# Patient Record
Sex: FEMALE | Race: WHITE | NOT HISPANIC OR LATINO | Employment: OTHER | URBAN - METROPOLITAN AREA
[De-identification: names, ages, dates, MRNs, and addresses within clinical notes are randomized per-mention and may not be internally consistent; named-entity substitution may affect disease eponyms.]

---

## 2017-02-27 ENCOUNTER — OFFICE VISIT - RIVER FALLS (OUTPATIENT)
Dept: FAMILY MEDICINE | Facility: CLINIC | Age: 52
End: 2017-02-27

## 2017-02-27 ASSESSMENT — MIFFLIN-ST. JEOR: SCORE: 1492.64

## 2018-09-04 ENCOUNTER — OFFICE VISIT - RIVER FALLS (OUTPATIENT)
Dept: FAMILY MEDICINE | Facility: CLINIC | Age: 53
End: 2018-09-04

## 2018-09-04 ASSESSMENT — MIFFLIN-ST. JEOR: SCORE: 1455.67

## 2018-09-05 LAB
CHOLEST SERPL-MCNC: 221 MG/DL
CHOLEST/HDLC SERPL: 4.9 {RATIO}
GLUCOSE BLD-MCNC: 84 MG/DL (ref 65–99)
HDLC SERPL-MCNC: 45 MG/DL
LDLC SERPL CALC-MCNC: 143 MG/DL
NONHDLC SERPL-MCNC: 176 MG/DL
TRIGL SERPL-MCNC: 193 MG/DL

## 2018-09-06 ENCOUNTER — OFFICE VISIT - RIVER FALLS (OUTPATIENT)
Dept: FAMILY MEDICINE | Facility: CLINIC | Age: 53
End: 2018-09-06

## 2019-09-21 ENCOUNTER — AMBULATORY - RIVER FALLS (OUTPATIENT)
Dept: FAMILY MEDICINE | Facility: CLINIC | Age: 54
End: 2019-09-21

## 2019-09-22 LAB
CHOLEST SERPL-MCNC: 196 MG/DL
CHOLEST/HDLC SERPL: 3.3 {RATIO}
GLUCOSE BLD-MCNC: 91 MG/DL (ref 65–99)
HDLC SERPL-MCNC: 60 MG/DL
LDLC SERPL CALC-MCNC: 120 MG/DL
NONHDLC SERPL-MCNC: 136 MG/DL
TRIGL SERPL-MCNC: 66 MG/DL

## 2019-09-24 ENCOUNTER — COMMUNICATION - RIVER FALLS (OUTPATIENT)
Dept: FAMILY MEDICINE | Facility: CLINIC | Age: 54
End: 2019-09-24

## 2019-09-27 ENCOUNTER — OFFICE VISIT - RIVER FALLS (OUTPATIENT)
Dept: FAMILY MEDICINE | Facility: CLINIC | Age: 54
End: 2019-09-27

## 2019-09-27 ASSESSMENT — MIFFLIN-ST. JEOR: SCORE: 1378.56

## 2019-11-05 ENCOUNTER — OFFICE VISIT - RIVER FALLS (OUTPATIENT)
Dept: FAMILY MEDICINE | Facility: CLINIC | Age: 54
End: 2019-11-05

## 2019-11-05 ASSESSMENT — MIFFLIN-ST. JEOR: SCORE: 1397.61

## 2020-02-04 ENCOUNTER — OFFICE VISIT - RIVER FALLS (OUTPATIENT)
Dept: FAMILY MEDICINE | Facility: CLINIC | Age: 55
End: 2020-02-04

## 2020-02-04 ASSESSMENT — MIFFLIN-ST. JEOR: SCORE: 1393.07

## 2020-02-06 LAB
BASOPHILS # BLD MANUAL: 39 10*3/UL (ref 0–200)
BASOPHILS NFR BLD MANUAL: 0.5 %
BUN SERPL-MCNC: 26 MG/DL (ref 7–25)
BUN/CREAT RATIO - HISTORICAL: 34 (ref 6–22)
CALCIUM SERPL-MCNC: 9.7 MG/DL (ref 8.6–10.4)
CHLORIDE BLD-SCNC: 105 MMOL/L (ref 98–110)
CO2 SERPL-SCNC: 26 MMOL/L (ref 20–32)
CREAT SERPL-MCNC: 0.77 MG/DL (ref 0.5–1.05)
EGFRCR SERPLBLD CKD-EPI 2021: 88 ML/MIN/1.73M2
EOSINOPHIL # BLD MANUAL: 77 10*3/UL (ref 15–500)
EOSINOPHIL NFR BLD MANUAL: 1 %
ERYTHROCYTE [DISTWIDTH] IN BLOOD BY AUTOMATED COUNT: 12.2 % (ref 11–15)
GLUCOSE BLD-MCNC: 94 MG/DL (ref 65–99)
HCT VFR BLD AUTO: 39.6 % (ref 35–45)
HGB BLD-MCNC: 13.8 GM/DL (ref 11.7–15.5)
LYMPHOCYTES # BLD MANUAL: 2710 10*3/UL (ref 850–3900)
LYMPHOCYTES NFR BLD MANUAL: 35.2 %
MCH RBC QN AUTO: 30.5 PG (ref 27–33)
MCHC RBC AUTO-ENTMCNC: 34.8 GM/DL (ref 32–36)
MCV RBC AUTO: 87.4 FL (ref 80–100)
MONOCYTES # BLD MANUAL: 647 10*3/UL (ref 200–950)
MONOCYTES NFR BLD MANUAL: 8.4 %
NEUTROPHILS # BLD MANUAL: 4227 10*3/UL (ref 1500–7800)
NEUTROPHILS NFR BLD MANUAL: 54.9 %
PLATELET # BLD AUTO: 297 10*3/UL (ref 140–400)
PMV BLD: 9.9 FL (ref 7.5–12.5)
POTASSIUM BLD-SCNC: 4.1 MMOL/L (ref 3.5–5.3)
RBC # BLD AUTO: 4.53 10*6/UL (ref 3.8–5.1)
SODIUM SERPL-SCNC: 138 MMOL/L (ref 135–146)
WBC # BLD AUTO: 7.7 10*3/UL (ref 3.8–10.8)

## 2020-12-07 ENCOUNTER — COMMUNICATION - RIVER FALLS (OUTPATIENT)
Dept: FAMILY MEDICINE | Facility: CLINIC | Age: 55
End: 2020-12-07

## 2020-12-09 ENCOUNTER — OFFICE VISIT - RIVER FALLS (OUTPATIENT)
Dept: FAMILY MEDICINE | Facility: CLINIC | Age: 55
End: 2020-12-09

## 2021-12-07 ENCOUNTER — OFFICE VISIT - RIVER FALLS (OUTPATIENT)
Dept: FAMILY MEDICINE | Facility: CLINIC | Age: 56
End: 2021-12-07

## 2021-12-07 ASSESSMENT — MIFFLIN-ST. JEOR: SCORE: 1423.69

## 2021-12-08 ENCOUNTER — COMMUNICATION - RIVER FALLS (OUTPATIENT)
Dept: FAMILY MEDICINE | Facility: CLINIC | Age: 56
End: 2021-12-08

## 2021-12-08 LAB
CHOLEST SERPL-MCNC: 246 MG/DL
CHOLEST/HDLC SERPL: 4.2 {RATIO}
GLUCOSE BLD-MCNC: 82 MG/DL (ref 65–99)
HDLC SERPL-MCNC: 59 MG/DL
LDLC SERPL CALC-MCNC: 164 MG/DL
NONHDLC SERPL-MCNC: 187 MG/DL
TRIGL SERPL-MCNC: 110 MG/DL

## 2022-01-03 ENCOUNTER — COMMUNICATION - RIVER FALLS (OUTPATIENT)
Dept: FAMILY MEDICINE | Facility: CLINIC | Age: 57
End: 2022-01-03

## 2022-02-12 VITALS
TEMPERATURE: 98 F | HEART RATE: 80 BPM | HEIGHT: 64 IN | WEIGHT: 186 LBS | SYSTOLIC BLOOD PRESSURE: 112 MMHG | BODY MASS INDEX: 31.76 KG/M2 | DIASTOLIC BLOOD PRESSURE: 79 MMHG

## 2022-02-12 VITALS
WEIGHT: 177.8 LBS | HEART RATE: 76 BPM | WEIGHT: 182 LBS | DIASTOLIC BLOOD PRESSURE: 74 MMHG | TEMPERATURE: 98.6 F | TEMPERATURE: 98.1 F | DIASTOLIC BLOOD PRESSURE: 80 MMHG | SYSTOLIC BLOOD PRESSURE: 117 MMHG | BODY MASS INDEX: 30.35 KG/M2 | HEIGHT: 64 IN | HEIGHT: 64 IN | SYSTOLIC BLOOD PRESSURE: 118 MMHG | BODY MASS INDEX: 31.07 KG/M2 | HEART RATE: 68 BPM

## 2022-02-12 VITALS
BODY MASS INDEX: 30.9 KG/M2 | HEART RATE: 69 BPM | TEMPERATURE: 98.2 F | WEIGHT: 181 LBS | HEIGHT: 64 IN | SYSTOLIC BLOOD PRESSURE: 109 MMHG | DIASTOLIC BLOOD PRESSURE: 76 MMHG

## 2022-02-12 VITALS
BODY MASS INDEX: 34.35 KG/M2 | TEMPERATURE: 99 F | SYSTOLIC BLOOD PRESSURE: 124 MMHG | OXYGEN SATURATION: 98 % | HEART RATE: 90 BPM | HEIGHT: 64 IN | DIASTOLIC BLOOD PRESSURE: 86 MMHG | WEIGHT: 201.2 LBS

## 2022-02-12 VITALS
HEIGHT: 64 IN | DIASTOLIC BLOOD PRESSURE: 77 MMHG | WEIGHT: 194.8 LBS | HEART RATE: 67 BPM | SYSTOLIC BLOOD PRESSURE: 108 MMHG | BODY MASS INDEX: 33.26 KG/M2 | TEMPERATURE: 98.3 F

## 2022-02-15 NOTE — PROGRESS NOTES
Patient:   QUINTIN VELAZQUEZ            MRN: [id number]            FIN: [id number]               Age:   56 years     Sex:  Female     :  1965   Associated Diagnoses:   Well adult exam; High cholesterol; CRUZITO on CPAP   Author:   Jacob Gonzalez MD      Visit Information      Date of Service: 2021 08:31 am  Performing Location: [facility]  Encounter#: [id number]      Primary Care Provider (PCP):  Jacob Gonzalez MD    NPI# [id number]   Visit type:  Annual exam.    Accompanied by:  No one.    Source of history:  Self.       Chief Complaint   2021 8:37 AM CST    Annual exam. COVID initial, flu, Tdap due. Cologuard due. Mammo due. DEXA due. Discuss if still needs amoxicillin.        Well Adult History   Patient is in today for annual exam.  Overall she is doing well.  She is now retired from [employer].  Enjoying FDC with her .  Enjoy the grandkids the home they are buildingJoin [employer] and riding motorcycle.          Review of Systems   Constitutional:  No weakness, No fatigue, No decreased activity, No weight gain.    Eye:  No recent visual problem, No blurring, No double vision.    Ear/Nose/Mouth/Throat:  Negative.    Respiratory:  No shortness of breath, No cough.    Cardiovascular:  No chest pain, No peripheral edema.    Gastrointestinal:  No nausea, No vomiting, No diarrhea, No constipation, No abdominal pain.    Genitourinary:  No dysuria, No hematuria, No change in urine stream.    Gynecologic:  Negative.    Hematology/Lymphatics:  Negative.    Endocrine:  Negative.    Immunologic:  Negative.    Musculoskeletal:  Negative.    Integumentary:  No rash.    Neurologic:  Alert and oriented X4.    Psychiatric:  Negative.       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  Documented Medications  Documented  Daily Multiple Vitamins: PO, Daily, 0      Histories   Past Medical History:    Active  Scalp psoriasis (928190104)  Obesity (9467250584)  Resolved  Skin cancer (7229548895): Onset on 10/1/2007 at 42 years.  Resolved.   Family History:    MI  Father ()  Diabetes mellitus type 2  Father ()     Procedure history:    Screening for colon cancer (SNOMED CT 8965950066) performed by Jacob Gonzalez MD on 2018 at 53 Years.  Comments:  10/1/2018 4:28 PM CDT - Olivia Rosales MA result:  negative  Recommendation:  f/u 3yrs  Cardiac computed tomography for calcium scoring (SNOMED CT 7258167747) on 2018 at 53 Years.  Comments:  2018 2:13 PM CDT - Alanis Klein  Total Agatston calcium score is 0  DEXA - Dual energy X-ray photon absorptiometry (SNOMED CT 271430885) on 2011 at 46 Years.  Comments:  2011 4:18 PM CST - Cait Arrington  Stable osteopenia. Repeat 5 yrs. (Due )  Laparoscopic vaginal hysterectomy (SNOMED CT 2527884596) performed by Emile Rivera MD on 2003 at 38 Years.  LAVH - Laparoscopy assisted vaginal hysterectomy (SNOMED CT 0890994382) on 2003 at 38 Years.  Salpingo-oophorectomy - Left on 2001 at 36 Years.  Laparoscopy with lysis of adhesions (SNOMED CT 94247209) on 2001 at 36 Years.  Fracture of Wrist (ICD-9-.00) in the month of 2000 at 35 Years.  Breast reduction, bilateral (SNOMED CT 212700725) in  at 23 Years.  Partial oophorectomy - Left in  at 22 Years.  NVD (Normal Vaginal  Delivery) (ICD-9-) in 1985 at 20 Years.   Social History:        Electronic Cigarette/Vaping Assessment            Electronic Cigarette Use: Never.      Alcohol Assessment            Current, Wine (5 oz), 0-1 time per week, 1 drinks/episode average.      Tobacco Assessment            Former smoker, quit more than 30 days ago            Former smoker, quit more than 30 days ago      Substance Abuse Assessment            Never      Employment and Education Assessment            Employed, Work/School description: .      Home and Environment Assessment            Marital status: .      Nutrition and Health Assessment            Type of diet: Regular.      Sexual Assessment            Sexually active: Yes.  Identifies as female, Sexual orientation: Straight or heterosexual.  ,        Electronic Cigarette/Vaping Assessment            Electronic Cigarette Use: Never.      Alcohol Assessment            Current, Wine (5 oz), 0-1 time per week, 1 drinks/episode average.      Tobacco Assessment            Former smoker, quit more than 30 days ago            Former smoker, quit more than 30 days ago      Substance Abuse Assessment            Never      Employment and Education Assessment            Employed, Work/School description: .      Home and Environment Assessment            Marital status: .      Nutrition and Health Assessment            Type of diet: Regular.      Sexual Assessment            Sexually active: Yes.  Identifies as female, Sexual orientation: Straight or heterosexual.        Physical Examination   Vital Signs   12/7/2021 8:37 AM CST Temperature Tympanic 98.0 DegF    Peripheral Pulse Rate 80 bpm    Pulse Site Radial artery    HR Method Electronic    Systolic Blood Pressure 112 mmHg    Diastolic Blood Pressure 79 mmHg    Mean Arterial Pressure 90 mmHg    BP Site Right arm    BP Method Electronic      Measurements from flowsheet : Measurements   12/7/2021 8:37  AM CST Height Measured - Standard 64 in    Weight Measured - Standard 186 lb    BSA 1.95 m2    Body Mass Index 31.92 kg/m2  HI      General:  Alert and oriented, No acute distress.    Eye:  Pupils are equal, round and reactive to light, Extraocular movements are intact, Normal conjunctiva, Vision unchanged.    HENT:  Normocephalic, Tympanic membranes are clear, Oral mucosa is moist, No pharyngeal erythema.    Neck:  Supple, Non-tender, No carotid bruit, No jugular venous distention, No lymphadenopathy, No thyromegaly.    Respiratory:  Lungs are clear to auscultation, Respirations are non-labored, Breath sounds are equal, Symmetrical chest wall expansion.    Cardiovascular:  Normal rate, Regular rhythm, No murmur, Good pulses equal in all extremities, Normal peripheral perfusion, No edema.    Gastrointestinal:  Soft, Non-tender, Non-distended, Normal bowel sounds, No organomegaly.    Musculoskeletal:  Normal range of motion, Normal strength, No tenderness, No deformity, Normal gait.    Integumentary:  Warm, Dry, No rash.    Neurologic:  Alert, Oriented, No focal deficits, Cranial Nerves II-XII are grossly intact.    Psychiatric:  Cooperative, Appropriate mood & affect.       Impression and Plan   Diagnosis     Well adult exam (MAU28-OQ Z00.00).     High cholesterol (WLI20-LI E78.00).     CRUZITO on CPAP (ZUI96-UJ G47.33).     Course:  Progressing as expected.    Plan:  fu 1 yr, 1.  Sleep apnea waiting for her new machine  2.  Hyperlipidemia with negative cardiac calcium score  3 history of hip replacement #  ???????4 history of osteopenia will get bone density  5.  Immunization update she will get her first Covid shot today she is due for flu shot and tetanus yet  .    Patient Instructions:       Counseled: Patient, Regarding diagnosis.

## 2022-02-15 NOTE — NURSING NOTE
Comprehensive Intake Entered On:  11/5/2019 6:43 PM CST    Performed On:  11/5/2019 6:39 PM CST by Maria Esther Goodson CMA               Summary   Chief Complaint :   R hip pain on/off x years but worsening since this weekend. Using Aleve, Ibuprofen and icing.    Weight Measured :   182 lb(Converted to: 182 lb 0 oz, 82.55 kg)    Height Measured :   63.5 in(Converted to: 5 ft 3 in, 161.29 cm)    Body Mass Index :   31.73 kg/m2 (HI)    Body Surface Area :   1.92 m2   Systolic Blood Pressure :   118 mmHg   Diastolic Blood Pressure :   80 mmHg   Mean Arterial Pressure :   93 mmHg   Peripheral Pulse Rate :   68 bpm   BP Site :   Right arm   Pulse Site :   Radial artery   BP Method :   Manual   HR Method :   Manual   Temperature Tympanic :   98.1 DegF(Converted to: 36.7 DegC)    Maria Esther Goodson CMA - 11/5/2019 6:39 PM CST   Health Status   Allergies Verified? :   Yes   Medication History Verified? :   Yes   Pre-Visit Planning Status :   Completed   Maria Esther Goodson CMA - 11/5/2019 6:39 PM CST   Meds / Allergies   (As Of: 11/5/2019 6:43:59 PM CST)   Allergies (Active)   No known allergies  Estimated Onset Date:   Unspecified ; Created By:   Karen Pettit CMA; Reaction Status:   Active ; Category:   Drug ; Substance:   No known allergies ; Type:   Allergy ; Updated By:   Karen Pettit CMA; Source:   Paper Chart ; Reviewed Date:   9/4/2018 2:11 PM CDT        Medication List   (As Of: 11/5/2019 6:43:59 PM CST)   Home Meds    multivitamin  :   multivitamin ; Status:   Documented ; Ordered As Mnemonic:   Daily Multiple Vitamins ; Simple Display Line:   PO, Daily ; Catalog Code:   multivitamin ; Order Dt/Tm:   2/16/2010 11:17:44 AM CST

## 2022-02-15 NOTE — NURSING NOTE
CAGE Assessment Entered On:  10/2/2019 8:27 AM CDT    Performed On:  9/27/2019 8:27 AM CDT by Jeannine Bonilla               Assessment   Have you ever felt you should cut down on your drinking :   No   Have people annoyed you by criticizing your drinking :   No   Have you ever felt bad or guilty about your drinking :   No   Have you ever taken a drink first thing in the morning to steady your nerves or get rid of a hangover (Eye-opener) :   No   CAGE Score :   0    Jeannine Bonilla - 10/2/2019 8:27 AM CDT

## 2022-02-15 NOTE — TELEPHONE ENCOUNTER
---------------------  From: Marina Guerrero (TFS Message Pool ([id number]))   To: Recall  Pool ([address] [place] [address]);     Sent: 10/2/2020 4:09:08 PM CDT  Subject: General Message     send letter per TFS      Addendum by Karen Goddard on October 02, 2020 9:34:02 AM CDT  ---------------------  From: Karen Goddard (Appointment Pool ([id number]))   To: TFS Message Pool ([address] [place] [address]);     Sent: 10/2/2020 9:34:02 AM CDT Show up: 10/2/2020 9:33:00 AM CDT  Subject: RE: WELL ADULT 2020     LVM x2- sent to pool/provider        Addendum by Timb , April on September 30, 2020 12:59:35 PM CDT  left voicemail         ---------------------  From: Malia Mckenzie (Recall  Pool ([id number]))   To: Appointment Pool ([address] [place] [address]);     Sent: 9/30/2020 11:22:37 AM CDT ! Show up: 9/30/2020 11:22:00 AM CDT        Addendum by Malia Mckenzie on September 30, 2020 11:22:33 AM CDT  Provider requesting patient complete services:  Due for:                 (_) fasting labs (nothing to eat or drink for 12 hrs prior to labs, may sip clear water and take medications morning of labs with water only)   (_) non-fasting labs  (_) urine labs (be prepared to leave a urine specimen)  (_) BP check   (_)  Follow-up appointment with Maye Kenney RD, CDE, CD  (X) follow-up appointment with your health care provider  Condition following up on: Dx:  Well Adult       Provider:  TFS         ---------------------  From: Malia Mckenzie   To: Recall  Pool ([id number]);     Sent: 9/30/2019 2:14:06 PM CDT Show up: 9/30/2020 6:00:00 AM CDT  Subject: WELL ADULT 2020   Due Date/Time: 9/30/2020 6:00:00 AM CDT     Return to Clinic for the following per TFS                                      Reason for visit: Well Adult                                                          Due: one year                           Additional Comments: noneSent Letter #1.  According to  policy no further contact will be made with the patient regarding this appointment.

## 2022-02-15 NOTE — LETTER
(Inserted Image. Unable to display)   2021  QUINTIN VELAZQUEZ  [address] [place] [address]        Dear QUINTIN,    Thank you for selecting [facility] for your healthcare needs.    Thank you for selecting [facility] for your healthcare needs.    You previously had a colon cancer screening (colonoscopy or Cologuard) at our facility and our records indicate you are now due for a follow up.  Although there may not be a problem at this time, it is our medical recommendation that you schedule an appointment for an exam with your Healthcare Provider to assess your health history to determine the proper colon cancer screening needed.  This exam can be combined with any scheduled appointment with your Healthcare Provider (physicals, medication checks, etc.)    Your Healthcare Provider will then complete the appropriate colon cancer screening order form for either a colonoscopy or Cologuard screening.   For a colonoscopy order, we encourage you to schedule your appointment that day.  Please note that depending on the type of anesthetic you are to receive your order will  in 30 or 90 days from your visit with your Healthcare Provider.  If you do not set up your colonoscopy during this time frame you will need to have another exam as your health history may have changed.    If you are seeing another Healthcare Provider for this problem, we would appreciate knowing so we do not send you another reminder.    To schedule an appointment or if you have further questions, please contact your primary clinic at [phone].      Powered by [employer]    Sincerely,    Jacob Gonzalez MD

## 2022-02-15 NOTE — PROGRESS NOTES
Patient:   QUINTIN VELAZQUEZ            MRN: [id number]            FIN: [id number]               Age:   52 years     Sex:  Female     :  1965   Associated Diagnoses:   Cough; Costochondritis   Author:   Nita Armendariz      Visit Information      Date of Service: 2017 03:08 pm  Performing Location: [facility]  Encounter#: [id number]      Primary Care Provider (PCP):  Jacob Gonzalez MD, NPI# [id number]      Referring Provider:  No referring provider recorded for selected visit.      Chief Complaint   2017 3:17 PM CST    c/o coughing, rib pain from coughing, hurts to breathe        History of Present Illness   Confirmed symptoms and concerns with patient as presented in CC above.   Had what she believed was influenza about 4 weeks ago, everything better except the cough, now has cough and pain that hurts with cough wrapping around right side of her chest back to front. No fever, believes infection has resolved, using OTC products that aren't effective. PMH of something simlar years ago  Non smoker      Health Status   Allergies:    Allergic Reactions (Selected)  No known allergies   Medications:  (Selected)   Prescriptions  Prescribed  Guiatuss AC 10 mg-100 mg/5 mL oral syrup: 10 mL, PO, q4hr, Instructions: not to exceed 12 teaspoons/day, use at bedtime, PRN: for cough, # 120 mL, 0 Refill(s), Type: Maintenance, Pharmacy: [facility], 10 mL po q4 hrs,PRN:for cough,Instr:not to exceed 12 teaspoons/day,...  predniSONE 10 mg oral tablet: 1 tab(s) ( 10 mg ), PO, Daily, Instructions: 4 tabs daily x3 days, then 3 tabs daily for 3 days, then 2 tabs daily for 3 days, then 1 tab daily for 3 days, # 30 tab(s), 0 Refill(s), Type: Maintenance, Pharmacy: [facility], 1 t...  Documented Medications  Documented  Daily Multiple Vitamins: PO, Daily, 0   Problem list:    All Problems  Skin cancer / ICD-9-.9 / Confirmed  Obesity / ICD-9-CM  278.00 / Probable  Scalp Psoriasis / ICD-9-.1 / Confirmed      Histories   Past Medical History:    Active  Skin cancer (173.9): Onset on 10/1/2007 at 42 years.  Scalp Psoriasis (696.1)   Family History:    MI  Father ()  Diabetes mellitus type 2  Father ()     Procedure history:    DEXA - Dual energy X-ray photon absorptiometry (SNOMED CT 952053832) on 2011 at 46 Years.  Comments:  2011 4:18 PM - Cait Arrington  Stable osteopenia. Repeat 5 yrs. (Due )  Laparoscopic vaginal hysterectomy (SNOMED CT 4901885530) performed by Emile Rivera MD on 2003 at 38 Years.  LAVH - Laparoscopy assisted vaginal hysterectomy (SNOMED CT 9997598905) on 2003 at 38 Years.  Salpingo-oophorectomy - Left on 2001 at 36 Years.  Laparoscopy with lysis of adhesions (SNOMED CT 98055926) on 2001 at 36 Years.  Fracture of Wrist (ICD-9-.00) in the month of 2000 at 35 Years.  Breast reduction, bilateral (SNOMED CT 967870212) in  at 23 Years.  Partial oophorectomy - Left in  at 22 Years.  NVD (Normal Vaginal Delivery) (ICD-9-) in  at 20 Years.   Social History:        Alcohol Assessment: Current            Rare.      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      Home and Environment Assessment            Marital status: .      Exercise and Physical Activity Assessment                     Comments:                      2011 - Gemini Kirk RN                     Active        Physical Examination   Vital Signs   2017 3:17 PM CST Temperature Tympanic 99 DegF    Peripheral Pulse Rate 90 bpm    Pulse Site Radial artery    HR Method Manual    Systolic Blood Pressure 124 mmHg    Diastolic Blood Pressure 86 mmHg    Mean Arterial Pressure 99 mmHg    BP Site Right arm    BP  Method Manual    Oxygen Saturation 98 %      Measurements from flowsheet : Measurements   2/27/2017 3:17 PM CST Height Measured - Standard 64 in    Weight Measured - Standard 201.2 lb    BSA 2.03 m2    Body Mass Index 34.53 kg/m2      General:  Alert and oriented, No acute distress.    Eye:  Normal conjunctiva.    HENT:  Tympanic membranes are clear, Normal hearing, Oral mucosa is moist, No pharyngeal erythema, No sinus tenderness.    Neck:  Supple, Non-tender, No lymphadenopathy.    Respiratory:  Lungs are clear to auscultation, Respirations are non-labored, Breath sounds are equal, Symmetrical chest wall expansion.    Cardiovascular:  Normal rate, Regular rhythm, No murmur.    Musculoskeletal:  Normal range of motion, Normal gait.    Integumentary:  Warm, Dry, Pink, No rash.    Neurologic:  Alert, Oriented.    Psychiatric:  Cooperative.       Impression and Plan   Diagnosis     Cough (DEC78-IT R05).     Costochondritis (TQC82-JK M94.0).     Patient Instructions:       Counseled: Patient, Regarding diagnosis, Regarding treatment, Regarding medications, Verbalized understanding, Counseled on symptomatic management. Return to clinic for re evaluation if worsening, simply not improving, or failure to resolve.   .    Orders     Orders (Selected)   Prescriptions  Prescribed  Guiatuss AC 10 mg-100 mg/5 mL oral syrup: 10 mL, PO, q4hr, Instructions: not to exceed 12 teaspoons/day, use at bedtime, PRN: for cough, # 120 mL, 0 Refill(s), Type: Maintenance, Pharmacy: [facility], 10 mL po q4 hrs,PRN:for cough,Instr:not to exceed 12 teaspoons/day,...  predniSONE 10 mg oral tablet: 1 tab(s) ( 10 mg ), PO, Daily, Instructions: 4 tabs daily x3 days, then 3 tabs daily for 3 days, then 2 tabs daily for 3 days, then 1 tab daily for 3 days, # 30 tab(s), 0 Refill(s), Type: Maintenance, Pharmacy: [facility], 1 t....

## 2022-02-15 NOTE — NURSING NOTE
Depression Screening Entered On:  10/2/2019 8:27 AM CDT    Performed On:  9/27/2019 8:27 AM CDT by Jeannine Bonilla               Depression Screening   Little Interest - Pleasure in Activities :   Not at all   Feeling Down, Depressed, Hopeless :   Not at all   Initial Depression Screen Score :   0    Trouble Falling or Staying Asleep :   Several days   Feeling Tired or Little Energy :   Not at all   Poor Appetite or Overeating :   Not at all   Feeling Bad About Yourself :   Not at all   Trouble Concentrating :   Not at all   Moving or Speaking Slowly :   Not at all   Thoughts Better Off Dead or Hurting Self :   Not at all   Detailed Depression Screen Score :   1    Total Depression Screen Score :   1    RENETTA Difficulty with Work, Home, Others :   Not difficult at all   Jeannine Bonilla - 10/2/2019 8:27 AM CDT

## 2022-02-15 NOTE — LETTER
(Inserted Image. Unable to display)   2021  QUINTIN VELAZQUEZ  [address] [place] [address]        Dear QUINTIN,    Thank you for selecting [facility] for your healthcare needs.    Thank you for selecting [facility] for your healthcare needs.    You previously had a colon cancer screening (colonoscopy or Cologuard) at our facility and our records indicate you are now due for a follow up.  Although there may not be a problem at this time, it is our medical recommendation that you schedule an appointment for an exam with your Healthcare Provider to assess your health history to determine the proper colon cancer screening needed.  This exam can be combined with any scheduled appointment with your Healthcare Provider (physicals, medication checks, etc.)    Your Healthcare Provider will then complete the appropriate colon cancer screening order form for either a colonoscopy or Cologuard screening.   For a colonoscopy order, we encourage you to schedule your appointment that day.  Please note that depending on the type of anesthetic you are to receive your order will  in 30 or 90 days from your visit with your Healthcare Provider.  If you do not set up your colonoscopy during this time frame you will need to have another exam as your health history may have changed.    If you are seeing another Healthcare Provider for this problem, we would appreciate knowing so we do not send you another reminder.    To schedule an appointment or if you have further questions, please contact your primary clinic at [phone].      Powered by [employer]    Sincerely,    Jacob Gonzalez MD

## 2022-02-15 NOTE — LETTER
(Inserted Image. Unable to display)   December 07, 2020      QUINTIN VELAZQUEZ  [address]   [place], [place] [id number]         To whom it may concern,              This letter is to inform you that we have received a copy of your mammogram results.  Your results were normal unless noted below.  You will also receive notice of your results from the radiologist within 7-10 days.  This letter is to let you know I have also received a copy and it is filed in your clinic chart.      Please plan on having your next mammogram done in _12 months.          Please contact me or my assistant at [phone] if you have any questions or concerns.     Sincerely,        Jacob Gonzalez MD

## 2022-02-15 NOTE — TELEPHONE ENCOUNTER
---------------------  From: Radha Bolden (Phone Messages Pool ([id number]))   To: TFS Message Pool ([address] [place] [address]);     Cc: Phone Messages Pool ([address] [place] [address]);      Sent: 12/7/2020 11:35:31 AM CST  Subject: Abx     Phone Message    PCP:   KAMLA    Time of Call: 9:59       Person Calling:  pt  Phone number:  [phone]  Returned call at: 11:33   Last office visit and reason:  preop 2/4/20    Note: Pt asking to have abx sent to her pharmacy due to an upcoming dental procedure. Was told after her hip replacement that should always be done. Her appt is 12/9/2020.     Called number vilma left and number was not taking calls.    Called home number [phone]. Her  answered and asked that I repeat the number Ashley left to see if it was correct. Was disconnected during call. Called number back and was unable to reach him.     Please tell pt KAMLA is not in clinic until tomorrow if she calls back.Pt r/c at 1:12 pm stating she did not receive any call on her cell. Appt on Wed.     Msg sent to TR to address first.---------------------  From: Madeline Shah CMA (Phone Messages Pool ([id number]))   To: Devin Silva MD;     Sent: 12/7/2020 1:51:54 PM CST  Subject: FW: Abx---------------------  From: Devin Silva MD   To: Phone Messages Pool ([address] [place] [address]);     Sent: 12/7/2020 2:01:20 PM CST  Subject: RE: Abx     amoxicillin 4 tabs 2 hours priorNotified pt, no further questions.

## 2022-02-15 NOTE — NURSING NOTE
Comprehensive Intake Entered On:  2021 8:43 AM CST    Performed On:  2021 8:37 AM CST by Maria Esther Goodson CMA               Summary   Chief Complaint :   Annual exam. COVID initial, flu, Tdap due. Cologuard due. Mammo due. DEXA due. Discuss if still needs amoxicillin.    Weight Measured :   186 lb(Converted to: 186 lb 0 oz, 84.368 kg)    Height Measured :   64 in(Converted to: 5 ft 4 in, 162.56 cm)    Body Mass Index :   31.92 kg/m2 (HI)    Body Surface Area :   1.95 m2   Systolic Blood Pressure :   112 mmHg   Diastolic Blood Pressure :   79 mmHg   Mean Arterial Pressure :   90 mmHg   Peripheral Pulse Rate :   80 bpm   BP Site :   Right arm   Pulse Site :   Radial artery   BP Method :   Electronic   HR Method :   Electronic   Temperature Tympanic :   98.0 DegF(Converted to: 36.7 DegC)    Maria Esther Goodson CMA - 2021 8:37 AM CST   Health Status   Allergies Verified? :   Yes   Medication History Verified? :   Yes   Pre-Visit Planning Status :   Completed   Kellee Maria Esther FONSECA - 2021 8:37 AM CST   Demographics   Last Name :   VELAZQUEZ   Address :   [address]    First Name :   QUINTIN Guillen Initial :   L   Responsible Party Date of Birth () :   1965 CST   City :   [place]   State :   [place]   Zip Code :   [address]   Contact Relationship to Patient (other) :   Patient   Maria Esther Goodson CMA - 2021 8:37 AM CST   Consents   Consent for Immunization Exchange :   Consent Granted   Consent for Immunizations to Providers :   Consent Granted   Kellee Maria Esther FONSECA - 2021 8:37 AM CST   Meds / Allergies   (As Of: 2021 8:43:51 AM CST)   Allergies (Active)   No known allergies  Estimated Onset Date:   Unspecified ; Created By:   Karen Cardona; Reaction Status:   Active ; Category:   Drug ; Substance:   No known allergies ; Type:   Allergy ; Updated By:   Karen Cardona; Source:   Paper Chart ; Reviewed Date:   2019 7:24 PM CST        Medication List   (As Of:  12/7/2021 8:43:51 AM CST)   Prescription/Discharge Order    amoxicillin  :   amoxicillin ; Status:   Prescribed ; Ordered As Mnemonic:   amoxicillin 500 mg oral tablet ; Simple Display Line:   2,000 mg, 4 tab(s), Oral, once, 2 hours prior to procedure, 4 tab(s), 2 Refill(s) ; Ordering Provider:   Devin Silva MD; Catalog Code:   amoxicillin ; Order Dt/Tm:   12/7/2020 1:57:42 PM CST            Home Meds    multivitamin  :   multivitamin ; Status:   Documented ; Ordered As Mnemonic:   Daily Multiple Vitamins ; Simple Display Line:   PO, Daily ; Catalog Code:   multivitamin ; Order Dt/Tm:   2/16/2010 11:17:44 AM CST            Social History   Social History   (As Of: 12/7/2021 8:43:51 AM CST)   Alcohol:        Current, Wine (5 oz), 0-1 time per week, 1 drinks/episode average.   (Last Updated: 9/9/2018 4:37:02 PM CDT by Piper Byrne)          Tobacco:        Former smoker, quit more than 30 days ago   (Last Updated: 9/9/2018 4:37:24 PM CDT by Piper Byrne)   Former smoker, quit more than 30 days ago   (Last Updated: 12/7/2021 8:37:53 AM CST by Maria Esther Goodson CMA)          Electronic Cigarette/Vaping:        Electronic Cigarette Use: Never.   (Last Updated: 12/7/2021 8:37:57 AM CST by Maria Esther Goodson CMA)          Substance Abuse:        Never   (Last Updated: 11/4/2011 1:16:10 PM CDT by Gemini Kirk RN)          Employment/School:        Employed, Work/School description: .   (Last Updated: 9/9/2018 4:37:35 PM CDT by Piper Byrne)          Home/Environment:        Marital status: .   (Last Updated: 11/2/2011 1:18:13 PM CDT by Pamella Crain)          Nutrition/Health:        Type of diet: Regular.   (Last Updated: 9/9/2018 4:37:42 PM CDT by Piper Byrne)          Sexual:        Sexually active: Yes.  Identifies as female, Sexual orientation: Straight or heterosexual.   (Last Updated: 9/9/2018 4:37:54 PM CDT by Piper Byrne)

## 2022-02-15 NOTE — PROGRESS NOTES
Patient:   QUINTIN VELAZQUEZ            MRN: [id number]            FIN: [id number]               Age:   54 years     Sex:  Female     :  1965   Associated Diagnoses:   Preoperative examination; Hip osteoarthritis   Author:   Jacob Gonzalez MD      Preoperative Information   Indication for surgery:  DJD Hip.    Accompanied by:  No one.    Source of history:  Self.    History limitation:  None.       Chief Complaint   2020 4:41 PM CST     DOS 3/3/2020 for R hip arthroplasty at [facility] with Dr. Schwarz.        Review of Systems   Constitutional:  Negative.    Eye:  Negative.    Ear/Nose/Mouth/Throat:  Negative.    Respiratory:  Negative.    Cardiovascular:  Negative.    Gastrointestinal:  Negative.    Genitourinary:  Negative.    Hematology/Lymphatics:  Negative.    Endocrine:  Negative.    Immunologic:  Negative.    Musculoskeletal:  Negative.    Integumentary:  Negative.    Neurologic:  Negative.       Health Status   Allergies:    Allergic Reactions (Selected)  No known allergies   Medications:  (Selected)   Documented Medications  Documented  Daily Multiple Vitamins: PO, Daily, 0,    Medications          *denotes recorded medication          *Daily Multiple Vitamins: PO, Daily.       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  Resolved.   Family History:    MI  Father ()  Diabetes mellitus type 2  Father ()     Procedure history:    Screening for colon cancer (SNOMED CT 7563846993) performed by Jacob Gonzalez MD on 2018 at 53 Years.  Comments:  10/1/2018 4:28 PM [facility] - Bobby JEFFERSON, Olivia Adrian result:   negative  Recommendation:  f/u 3yrs  Cardiac computed tomography for calcium scoring (SNOMED CT 3517350872) on 9/14/2018 at 53 Years.  Comments:  9/24/2018 2:13 PM CDT - Latoya Hipolitoi  Total Agatston calcium score is 0  DEXA - Dual energy X-ray photon absorptiometry (SNOMED CT 641580953) on 11/16/2011 at 46 Years.  Comments:  11/28/2011 4:18 PM CST - Cait Arrington  Stable osteopenia. Repeat 5 yrs. (Due 2016)  Laparoscopic vaginal hysterectomy (SNOMED CT 7123921017) performed by Emile Rivera MD on 7/30/2003 at 38 Years.  LAVH - Laparoscopy assisted vaginal hysterectomy (SNOMED CT 4455604424) on 7/30/2003 at 38 Years.  Salpingo-oophorectomy - Left on 9/23/2001 at 36 Years.  Laparoscopy with lysis of adhesions (SNOMED CT 52622639) on 8/23/2001 at 36 Years.  Fracture of Wrist (ICD-9-.00) in the month of 11/2000 at 35 Years.  Breast reduction, bilateral (SNOMED CT 808295682) in 1988 at 23 Years.  Partial oophorectomy - Left in 1987 at 22 Years.  NVD (Normal Vaginal Delivery) (ICD-9-) in 1985 at 20 Years.   Social History:        Alcohol Assessment            Current, Wine (5 oz), 0-1 time per week, 1 drinks/episode average.      Tobacco Assessment            Former smoker, quit more than 30 days ago      Substance Abuse Assessment            Never      Employment and Education Assessment            Employed, Work/School description: .      Home and Environment Assessment            Marital status: .      Nutrition and Health Assessment            Type of diet: Regular.      Sexual Assessment            Sexually active: Yes.  Identifies as female, Sexual orientation: Straight or heterosexual.       Has  no history of anemia.  Has no history of DVT or pulmonary embolism.  Has  no personal history of bleeding problems.   Has no personal history of anesthesia reactions.  Patient  does not have active tuberculosis.    S/he  has not taken aspirin or aspirin containing products in  the last week.     S/he  has not taken Plavix (Clopidogrel) in the last 2 weeks.    S/he  has not taken warfarin in the past week.    S/he  has not been on corticosteroids for more than 2 weeks recently.      S/he  is not DNR before, during or after surgery.         Physical Examination   Vital Signs   2/4/2020 4:41 PM CST Temperature Tympanic 98.2 DegF    Peripheral Pulse Rate 69 bpm    Pulse Site Radial artery    HR Method Electronic    Systolic Blood Pressure 109 mmHg    Diastolic Blood Pressure 76 mmHg    Mean Arterial Pressure 87 mmHg    BP Site Right arm    BP Method Electronic      Measurements from flowsheet : Measurements   2/4/2020 4:41 PM CST Height Measured - Standard 63.5 in    Weight Measured - Standard 181 lb    BSA 1.92 m2    Body Mass Index 31.56 kg/m2  HI      General:  Alert and oriented, No acute distress.    Eye:  Normal conjunctiva.    HENT:  Normocephalic, Tympanic membranes are clear, Oral mucosa is moist, No pharyngeal erythema.    Neck:  Supple, Non-tender, No lymphadenopathy, No thyromegaly.    Respiratory:  Breath sounds are equal, Symmetrical chest wall expansion.         Respirations: Are within normal limits.         Pattern: Regular.         Breath sounds: Bilateral, Within normal limits.    Cardiovascular:  Normal rate, Regular rhythm, No murmur, Normal peripheral perfusion, No edema.    Gastrointestinal:  Soft, Non-tender, Non-distended, Normal bowel sounds, No organomegaly.    Genitourinary:  No costovertebral angle tenderness.    Integumentary:  Warm, Dry, No rash.    Neurologic:  Alert, Oriented.       Impression and Plan   Diagnosis     Preoperative examination (FTP82-PE Z01.818).     Hip osteoarthritis (BPL85-JH M16.9).     Condition:  Okay for surgery asa1.

## 2022-02-15 NOTE — NURSING NOTE
Comprehensive Intake Entered On:  2/4/2020 4:46 PM CST    Performed On:  2/4/2020 4:41 PM CST by Maria Esther Goodson CMA               Summary   Chief Complaint :   DOS 3/3/2020 for R hip arthroplasty at [facility] with Dr. Schwarz.    Weight Measured :   181 lb(Converted to: 181 lb 0 oz, 82.10 kg)    Height Measured :   63.5 in(Converted to: 5 ft 3 in, 161.29 cm)    Body Mass Index :   31.56 kg/m2 (HI)    Body Surface Area :   1.92 m2   Systolic Blood Pressure :   109 mmHg   Diastolic Blood Pressure :   76 mmHg   Mean Arterial Pressure :   87 mmHg   Peripheral Pulse Rate :   69 bpm   BP Site :   Right arm   Pulse Site :   Radial artery   BP Method :   Electronic   HR Method :   Electronic   Temperature Tympanic :   98.2 DegF(Converted to: 36.8 DegC)    Maria Esther Goodson CMA - 2/4/2020 4:41 PM CST   Health Status   Allergies Verified? :   Yes   Medication History Verified? :   Yes   Pre-Visit Planning Status :   Completed   Maria Esther Goodson CMA - 2/4/2020 4:41 PM CST   Meds / Allergies   (As Of: 2/4/2020 4:46:11 PM CST)   Allergies (Active)   No known allergies  Estimated Onset Date:   Unspecified ; Created By:   Karen Pettit CMA; Reaction Status:   Active ; Category:   Drug ; Substance:   No known allergies ; Type:   Allergy ; Updated By:   Karen Pettit CMA; Source:   Paper Chart ; Reviewed Date:   11/5/2019 7:24 PM CST        Medication List   (As Of: 2/4/2020 4:46:11 PM CST)   Prescription/Discharge Order    predniSONE  :   predniSONE ; Status:   Processing ; Ordered As Mnemonic:   predniSONE 10 mg oral tablet ; Ordering Provider:   Jacob Gonzalez MD; Action Display:   Complete ; Catalog Code:   predniSONE ; Order Dt/Tm:   2/4/2020 4:44:56 PM CST            Home Meds    multivitamin  :   multivitamin ; Status:   Documented ; Ordered As Mnemonic:   Daily Multiple Vitamins ; Simple Display Line:   PO, Daily ; Catalog Code:   multivitamin ; Order Dt/Tm:   2/16/2010 11:17:44 AM CST

## 2022-02-15 NOTE — TELEPHONE ENCOUNTER
"---------------------  From: Marina Guerrero   [id number]     Sent: 11/6/2019 10:29:56 AM CST  Subject: Result: Xray     Left message for patient to call back at: 10:29am regarding recent xray results.   Per TFS, Xray shows arthritis in the hip, no fractures.Pt LM at 11:41am. OK to LM at [phone] if no answer.    Returned call at 11:53am and LM informing pt of results.Pt LM at 11:59am stating she got the voicemail with results.     Pt says last night TFS referred her to orthopedics. Pt wondering this changes anything- does she still go to ortho or what is her \"go forward plan.\"    OK to LM at [phone]---------------------  From: Marina Guerrero ([facility] Message Pool ([facility]))   To: Jacob Gonzalez MD;     Sent: 11/6/2019 12:17:08 PM CST  Subject: FW: Result: Xray---------------------  From: Jacob Gonzalez MD   To: [facility] Message Pool ([facility] [facility] [place]);     Sent: 11/6/2019 12:38:38 PM CST  Subject: RE: Result: Xray     nothing changes still see orthoSpoke with patient and notified of recommendations.  "

## 2022-02-15 NOTE — LETTER
(Inserted Image. Unable to display)       September 05, 2019      QUINTIN VELAZQUEZ  [address]   [place], [place] [id number]          Dear QUINTIN,      Thank you for selecting [facility] for your healthcare needs.     Our records indicate you are due for the following services:    Fasting Lab Tests ~ Please do not eat or drink anything 10 hours prior to your scheduled appointment time.  (Water and any medications that you may need are allowed unless directed otherwise.)    If you had your labs done at another facility or with Direct Access Lab Testing at [facility], please bring in a copy of the results to your next visit, mail a copy, or drop off a copy of your results to your Healthcare Provider.    Annual Physical and Gynecologic Exam ~ Yearly wellness exams are important for your ongoing health and wellness.  This exam gives you the opportunity to meet with your Healthcare Provider to review your health, update immunizations and to recommend preventive screenings that you may be due for.  At your wellness visit, your Healthcare Provider will determine the need for a gynecologic exam and/or pap smear.    You are due for lab work and an office visit; please schedule the lab appointment 1 week before the office visit.  This will assure all results are available to discuss with your Healthcare Provider during your visit.    **It is very helpful if you bring your medication bottles to your appointment.  This assures we have all of your current medications, including strength and dosing information, documented accurately in your medical record.    To schedule an appointment or if you have further questions, please contact your primary clinic:   [facility]       [phone]   [facility]       [phone]              [facility]     [phone]    Powered  by [facility] and [employer])    Sincerely,    Jacob Gonzalez MD

## 2022-02-15 NOTE — LETTER
(Inserted Image. Unable to display)     October 05, 2020      QUINTIN VELAZQUEZ  [address]   [place], [place] [id number]          Dear QUINTIN,      Thank you for selecting [facility] for your healthcare needs.      Our records indicate you are due for the following services:     Annual Physical.      To schedule an appointment or if you have further questions, please contact your primary clinic:   [facility]       [phone]   [facility]       [phone]              [facility]     [phone]      Powered by [employer] and [employer]    Sincerely,    Jacob Gonzalez MD

## 2022-02-15 NOTE — PROGRESS NOTES
Patient:   QUINTIN VELAZQUEZ            MRN: [id number]            FIN: [id number]               Age:   54 years     Sex:  Female     :  1965   Associated Diagnoses:   Annual exam; Elevated Cholesterol   Author:   Jacob Gonzalez MD      Visit Information      Date of Service: 2019 02:48 pm  Performing Location: [facility]  Encounter#: [id number]      Primary Care Provider (PCP):  Jacob Gonzalez MD    NPI# [id number]   Visit type:  Annual exam.    Accompanied by:  No one.    Source of history:  Self.       Chief Complaint   2019 2:52 PM CDT    Px        Well Adult History   Well Adult History   The general health status is good.  The patient's diet is described as balanced.  Exercise: none.  Associated symptoms consist of none.  The effect on daily activities is no change in activity level, no change in eating habits, no change in sexual activity, no change in sleeping patterns and no change in work/school duties.  Associated symptoms characterized by weight gain.  Additional pertinent history: occasional caffeine use and tobacco use none.     Works at [employer] in 18 months  & grandkids      Review of Systems   Constitutional:  No weakness, No fatigue, No decreased activity, No weight gain.    Eye:  No recent visual problem, No blurring, No double vision.    Ear/Nose/Mouth/Throat:  Negative.    Respiratory:  No shortness of breath, No cough.    Cardiovascular:  No chest pain, No peripheral edema.    Gastrointestinal:  No nausea, No vomiting, No diarrhea, No constipation, No abdominal pain.    Genitourinary:  No dysuria, No hematuria, No change in urine stream.    Gynecologic:  Negative.    Hematology/Lymphatics:  Negative.    Endocrine:  Negative.    Immunologic:  Negative.    Musculoskeletal:  Negative.    Integumentary:  No rash.    Neurologic:  Alert and oriented X4.    Psychiatric:  Negative.       Health Status   Allergies:    Allergic Reactions (Selected)  No  known allergies   Medications:  (Selected)   Documented Medications  Documented  Daily Multiple Vitamins: PO, Daily, 0   Problem list:    All Problems  High cholesterol / SNOMED CT 81214307 / Confirmed  Obesity / SNOMED CT 6451495001 / Probable  Scalp psoriasis / SNOMED CT 119542343 / Confirmed  Resolved: Skin cancer / SNOMED CT 4829173969      Histories   Past Medical History:    Active  Scalp psoriasis (719402995)  Obesity (4177442422)  Resolved  Skin cancer (1125317599): Onset on 10/1/2007 at 42 years.  Resolved.   Family History:    MI  Father ()  Diabetes mellitus type 2  Father ()     Procedure history:    Screening for colon cancer (SNOMED CT 7395392345) performed by Jacob Gonzalez MD on 2018 at 53 Years.  Comments:  10/1/2018 4:28 PM CDT - Olivia Rosales MA result:  negative  Recommendation:  f/u 3yrs  Cardiac computed tomography for calcium scoring (SNOMED CT 5076656641) on 2018 at 53 Years.  Comments:  2018 2:13 PM CDT - Alanis Klein  Total Agatston calcium score is 0  DEXA - Dual energy X-ray photon absorptiometry (SNOMED CT 459596535) on 2011 at 46 Years.  Comments:  2011 4:18 PM CST - Cait Arrington  Stable osteopenia. Repeat 5 yrs. (Due )  Laparoscopic vaginal hysterectomy (SNOMED CT 8662083132) performed by Emile Rivera MD on 2003 at 38 Years.  LAVH - Laparoscopy assisted vaginal hysterectomy (SNOMED CT 9886865262) on 2003 at 38 Years.  Salpingo-oophorectomy - Left on 2001 at 36 Years.  Laparoscopy with lysis of adhesions (SNOMED CT 20063400) on 2001 at 36 Years.  Fracture of Wrist (ICD-9-.00) in the month of 2000 at 35 Years.  Breast reduction, bilateral (SNOMED CT 304783110) in  at 23 Years.  Partial oophorectomy - Left in  at 22 Years.  NVD (Normal Vaginal Delivery) (ICD-9-) in  at 20 Years.   Social History:        Alcohol Assessment            Current, Wine (5 oz), 0-1 time  per week, 1 drinks/episode average.      Tobacco Assessment            Former smoker, quit more than 30 days ago      Substance Abuse Assessment            Never      Employment and Education Assessment            Employed, Work/School description: .      Home and Environment Assessment            Marital status: .      Nutrition and Health Assessment            Type of diet: Regular.      Sexual Assessment            Sexually active: Yes.  Identifies as female, Sexual orientation: Straight or heterosexual.        Physical Examination   Vital Signs   9/27/2019 2:52 PM CDT Temperature Tympanic 98.6 DegF    Peripheral Pulse Rate 76 bpm    HR Method Electronic    Systolic Blood Pressure 117 mmHg    Diastolic Blood Pressure 74 mmHg    Mean Arterial Pressure 88 mmHg    BP Method Electronic      Measurements from flowsheet : Measurements   9/27/2019 2:52 PM CDT Height Measured - Standard 63.5 in    Weight Measured - Standard 177.8 lb    BSA 1.9 m2    Body Mass Index 31 kg/m2  HI      General:  Alert and oriented, No acute distress.    Eye:  Pupils are equal, round and reactive to light, Extraocular movements are intact, Normal conjunctiva, Vision unchanged.    HENT:  Normocephalic, Tympanic membranes are clear, Oral mucosa is moist, No pharyngeal erythema.    Neck:  Supple, Non-tender, No carotid bruit, No jugular venous distention, No lymphadenopathy, No thyromegaly.    Respiratory:  Lungs are clear to auscultation, Respirations are non-labored, Breath sounds are equal, Symmetrical chest wall expansion.    Cardiovascular:  Normal rate, Regular rhythm, No murmur, Good pulses equal in all extremities, Normal peripheral perfusion, No edema.    Gastrointestinal:  Soft, Non-tender, Non-distended, Normal bowel sounds, No organomegaly.    Musculoskeletal:  Normal range of motion, Normal strength, No tenderness, No deformity, Normal gait.    Integumentary:  Warm, Dry, No rash.    Neurologic:  Alert,  Oriented, No focal deficits, Cranial Nerves II-XII are grossly intact.    Psychiatric:  Cooperative, Appropriate mood & affect.       Impression and Plan   Diagnosis     Annual exam (VBF83-SU Z00.00).     Elevated Cholesterol (FER68-BP E78.00).     Course:  Progressing as expected.    Orders     Orders (Selected)   Outpatient Orders  Ordered  Return to Clinic (Request): RFV: Cologuard. Dx:  colon cancer screen, Return in 3yrs  Return to Clinic (Request): RFV: Yearly px with TFS, Return in 1 year  Return to Clinic (Request): RFV: Yearly px with TFS, Return in 1 year  Documented Medications  Documented  Daily Multiple Vitamins: PO, Daily, 0.     Plan:    Mamogram  .         Follow-up: With Primary Care Provider, In 12 months.    Patient Instructions:       Counseled: Patient, Regarding diagnosis, Regarding medications, Verbalized understanding.

## 2022-02-15 NOTE — TELEPHONE ENCOUNTER
---------------------  From: Maria Esther Goodson CMA   Sent: 12/7/2021 10:02:28 AM CST  Subject: DEXA/mammo     orders faxed to [facility] per pt request

## 2022-02-15 NOTE — NURSING NOTE
Comprehensive Intake Entered On:  9/27/2019 2:53 PM CDT    Performed On:  9/27/2019 2:52 PM CDT by Marina Guerrero               Summary   Chief Complaint :   Px   Weight Measured :   177.8 lb(Converted to: 177 lb 13 oz, 80.65 kg)    Height Measured :   63.5 in(Converted to: 5 ft 3 in, 161.29 cm)    Body Mass Index :   31 kg/m2 (HI)    Body Surface Area :   1.9 m2   Systolic Blood Pressure :   117 mmHg   Diastolic Blood Pressure :   74 mmHg   Mean Arterial Pressure :   88 mmHg   Peripheral Pulse Rate :   76 bpm   BP Method :   Electronic   HR Method :   Electronic   Temperature Tympanic :   98.6 DegF(Converted to: 37.0 DegC)    Marina Guerrero - 9/27/2019 2:52 PM CDT   Health Status   Allergies Verified? :   Yes   Medication History Verified? :   Yes   Pre-Visit Planning Status :   Completed   Tobacco Use? :   Former smoker   Marina Guerrero - 9/27/2019 2:52 PM CDT   Meds / Allergies   (As Of: 9/27/2019 2:53:15 PM CDT)   Allergies (Active)   No known allergies  Estimated Onset Date:   Unspecified ; Created By:   Karen Pettit CMA; Reaction Status:   Active ; Category:   Drug ; Substance:   No known allergies ; Type:   Allergy ; Updated By:   Karen Pettit CMA; Source:   Paper Chart ; Reviewed Date:   9/4/2018 2:11 PM CDT        Medication List   (As Of: 9/27/2019 2:53:15 PM CDT)   Home Meds    multivitamin  :   multivitamin ; Status:   Documented ; Ordered As Mnemonic:   Daily Multiple Vitamins ; Simple Display Line:   PO, Daily ; Catalog Code:   multivitamin ; Order Dt/Tm:   2/16/2010 11:17:44 AM

## 2022-02-15 NOTE — PROGRESS NOTES
Patient:   QUINTIN VELAZQUEZ            MRN: [id number]            FIN: [id number]               Age:   54 years     Sex:  Female     :  1965   Associated Diagnoses:   Localized osteoarthrosis of right hip   Author:   Jacob Gonzalez MD      Visit Information      Date of Service: 2019 06:34 pm  Performing Location: [facility]  Encounter#: [id number]      Chief Complaint   2019 6:39 PM CST    R hip pain on/off x years but worsening since this weekend. Using Aleve, Ibuprofen and icing.        History of Present Illness   chief complaint and symptoms as noted above confirmed with patient   no injury  keeps her up at night  limps      Review of Systems   Constitutional:  Negative except as documented in history of present illness.    Integumentary:  Negative.    Neurologic:  Negative.       Health Status   Allergies:    Allergic Reactions (Selected)  No known allergies   Medications:  (Selected)   Prescriptions  Prescribed  predniSONE 10 mg oral tablet: 4 tabs daily for 3 days taper bey 1 tab every 3 days, Oral, daily, # 30 tab(s), 0 Refill(s), Type: Maintenance, Pharmacy: [facility], 4 tabs daily for 3 days taper bey 1 tab every 3 days Oral daily  Documented Medications  Documented  Daily Multiple Vitamins: PO, Daily, 0   Problem list:    All Problems (Selected)  High cholesterol / SNOMED CT 90527331 / Confirmed  Obesity / SNOMED CT 7176036863 / Probable  Scalp psoriasis / SNOMED CT 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  Resolved.   Family History:    MI  Father ()  Diabetes mellitus type 2  Father ()     Procedure history:    Screening for colon cancer (SNOMED CT 2170485170) performed by Jacob Gonzalez MD on 2018 at 53 Years.  Comments:  10/1/2018 4:28 PM [facility] - Bobby JEFFERSON,  Olivia  Cologuard result:  negative  Recommendation:  f/u 3yrs  Cardiac computed tomography for calcium scoring (SNOMED CT 2716157417) on 9/14/2018 at 53 Years.  Comments:  9/24/2018 2:13 PM CDT - Alanis Klein  Total Agatston calcium score is 0  DEXA - Dual energy X-ray photon absorptiometry (SNOMED CT 601777371) on 11/16/2011 at 46 Years.  Comments:  11/28/2011 4:18 PM CST - Cait Arrington  Stable osteopenia. Repeat 5 yrs. (Due 2016)  Laparoscopic vaginal hysterectomy (SNOMED CT 6061595987) performed by Emile Rivera MD on 7/30/2003 at 38 Years.  LAVH - Laparoscopy assisted vaginal hysterectomy (SNOMED CT 8423327386) on 7/30/2003 at 38 Years.  Salpingo-oophorectomy - Left on 9/23/2001 at 36 Years.  Laparoscopy with lysis of adhesions (SNOMED CT 11416592) on 8/23/2001 at 36 Years.  Fracture of Wrist (ICD-9-.00) in the month of 11/2000 at 35 Years.  Breast reduction, bilateral (SNOMED CT 472864550) in 1988 at 23 Years.  Partial oophorectomy - Left in 1987 at 22 Years.  NVD (Normal Vaginal Delivery) (ICD-9-) in 1985 at 20 Years.   Social History:        Alcohol Assessment            Current, Wine (5 oz), 0-1 time per week, 1 drinks/episode average.      Tobacco Assessment            Former smoker, quit more than 30 days ago      Substance Abuse Assessment            Never      Employment and Education Assessment            Employed, Work/School description: .      Home and Environment Assessment            Marital status: .      Nutrition and Health Assessment            Type of diet: Regular.      Sexual Assessment            Sexually active: Yes.  Identifies as female, Sexual orientation: Straight or heterosexual.        Physical Examination   Vital Signs   11/5/2019 6:39 PM CST Temperature Tympanic 98.1 DegF    Peripheral Pulse Rate 68 bpm    Pulse Site Radial artery    HR Method Manual    Systolic Blood Pressure 118 mmHg    Diastolic Blood Pressure 80 mmHg    Mean  Arterial Pressure 93 mmHg    BP Site Right arm    BP Method Manual      Measurements from flowsheet : Measurements   11/5/2019 6:39 PM CST Height Measured - Standard 63.5 in    Weight Measured - Standard 182 lb    BSA 1.92 m2    Body Mass Index 31.73 kg/m2  HI      General:  Alert and oriented, No acute distress.    Musculoskeletal:       Lower extremity exam: Hip ( Right, Anterior, No swelling, Tenderness, Range of motion ( Diminished ), cms otherwise intact ).    Neurologic:  Alert, Oriented.       Review / Management   Radiology results   djd      Impression and Plan   Diagnosis     Localized osteoarthrosis of right hip (BSX36-HP M16.11).     Course:  Not progressing as expected.    Plan:  pred taper  xray reviewed by myself and communicated to patient. I will call patient if the final reading is any different.         Refer to: Orthopedics.

## 2022-02-15 NOTE — PROGRESS NOTES
Patient:   QUINTIN VELAZQUEZ            MRN: [id number]            FIN: [id number]               Age:   53 years     Sex:  Female     :  1965   Associated Diagnoses:   Annual exam; Elevated Cholesterol   Author:   Jacob Gonzalez MD      Visit Information      Date of Service: 2018 12:47 pm  Performing Location: [facility]  Encounter#: [id number]      Primary Care Provider (PCP):  Jacob Gonzalez MD    NPI# [id number]   Visit type:  Annual exam.    Accompanied by:  No one.    Source of history:  Self.       Chief Complaint   2018 12:59 PM CDT    Px        Well Adult History   Well Adult History   The general health status is good.  The patient's diet is described as balanced.  Exercise: none.  Associated symptoms consist of none.  The effect on daily activities is no change in activity level, no change in eating habits, no change in sexual activity, no change in sleeping patterns and no change in work/school duties.  Associated symptoms characterized by weight gain.  Additional pertinent history: occasional caffeine use and tobacco use none.        Review of Systems   Constitutional:  No weakness, No fatigue, No decreased activity, No weight gain.    Eye:  No recent visual problem, No blurring, No double vision.    Ear/Nose/Mouth/Throat:  Negative.    Respiratory:  No shortness of breath, No cough.    Cardiovascular:  No chest pain, No peripheral edema.    Gastrointestinal:  No nausea, No vomiting, No diarrhea, No constipation, No abdominal pain.    Genitourinary:  No dysuria, No hematuria, No change in urine stream.    Gynecologic:  Negative.    Hematology/Lymphatics:  Negative.    Endocrine:  Negative.    Immunologic:  Negative.    Musculoskeletal:  Negative.    Integumentary:  No rash.    Neurologic:  Alert and oriented X4.    Psychiatric:  Negative.       Health Status   Allergies:    Allergic Reactions (Selected)  No known allergies   Medications:  (Selected)   Documented  Medications  Documented  Daily Multiple Vitamins: PO, Daily, 0   Problem list:    All Problems  Obesity / ICD-9-.00 / Probable  Scalp Psoriasis / ICD-9-.1 / Confirmed  Skin cancer / ICD-9-.9 / Confirmed      Histories   Past Medical History:    Active  Skin cancer (173.9): Onset on 10/1/2007 at 42 years.  Scalp Psoriasis (696.1)   Family History:    MI  Father ()  Diabetes mellitus type 2  Father ()     Procedure history:    DEXA - Dual energy X-ray photon absorptiometry (SNOMED CT 119115014) on 2011 at 46 Years.  Comments:  2011 4:18 PM - Cait Arrington  Stable osteopenia. Repeat 5 yrs. (Due )  Laparoscopic vaginal hysterectomy (SNOMED CT 6093697543) performed by Emile Rivera MD on 2003 at 38 Years.  LAVH - Laparoscopy assisted vaginal hysterectomy (SNOMED CT 9021786685) on 2003 at 38 Years.  Salpingo-oophorectomy - Left on 2001 at 36 Years.  Laparoscopy with lysis of adhesions (SNOMED CT 24731827) on 2001 at 36 Years.  Fracture of Wrist (ICD-9-.00) in the month of 2000 at 35 Years.  Breast reduction, bilateral (SNOMED CT 307468791) in  at 23 Years.  Partial oophorectomy - Left in  at 22 Years.  NVD (Normal Vaginal Delivery) (ICD-9-) in  at 20 Years.   Social History:        Alcohol Assessment: Current            Rare.      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      Home and Environment Assessment            Marital status: .      Exercise and Physical Activity Assessment                     Comments:                      2011 - Gemini Kirk RN                     Active        Physical Examination   Vital Signs   2018 12:59 PM CDT Temperature Tympanic 98.3 DegF    Peripheral Pulse Rate 67 bpm    HR Method Electronic     Systolic Blood Pressure 108 mmHg    Diastolic Blood Pressure 77 mmHg    Mean Arterial Pressure 87 mmHg    BP Method Electronic      Measurements from flowsheet : Measurements   9/4/2018 12:59 PM CDT Height Measured - Standard 63.5 in    Weight Measured - Standard 194.8 lb    BSA 1.99 m2    Body Mass Index 33.96 kg/m2  HI      General:  Alert and oriented, No acute distress.    Eye:  Pupils are equal, round and reactive to light, Extraocular movements are intact, Normal conjunctiva, Vision unchanged.    HENT:  Normocephalic, Tympanic membranes are clear, Oral mucosa is moist, No pharyngeal erythema.    Neck:  Supple, Non-tender, No carotid bruit, No jugular venous distention, No lymphadenopathy, No thyromegaly.    Respiratory:  Lungs are clear to auscultation, Respirations are non-labored, Breath sounds are equal, Symmetrical chest wall expansion.    Cardiovascular:  Normal rate, Regular rhythm, No murmur, Good pulses equal in all extremities, Normal peripheral perfusion, No edema.    Gastrointestinal:  Soft, Non-tender, Non-distended, Normal bowel sounds, No organomegaly.    Musculoskeletal:  Normal range of motion, Normal strength, No tenderness, No deformity, Normal gait.    Integumentary:  Warm, Dry, No rash.    Neurologic:  Alert, Oriented, No focal deficits, Cranial Nerves II-XII are grossly intact.    Psychiatric:  Cooperative, Appropriate mood & affect.       Impression and Plan   Diagnosis     Annual exam (YDL64-DW Z00.00).     Elevated Cholesterol (CBU84-EO E78.00).     Course:  Progressing as expected.    Orders     Orders (Selected)   Outpatient Orders  Ordered  Dietary Consult Request: Referred to: pedro, Reason: see dx  Referral (Request): 09/04/18 13:08:00 CDT, Referred to: Other, Reason for referral: card calc score, Family history of heart attack  Return to Clinic (Request): RFV: Yearly px with TFS, Return in 1 year  Ordered (In Transit)  Glucose* ([facility]): Specimen Type: Serum, Collection Date:  09/04/18 11:50:00 CDT  Lipid panel with reflex to direct ldl* ([facility]): Specimen Type: Serum, Collection Date: 09/04/18 11:50:00 CDT  Documented Medications  Documented  Daily Multiple Vitamins: PO, Daily, 0.     Plan:  Cardiac calc score  Mamogram  bone density.         Follow-up: With Primary Care Provider, In 12 months.    Patient Instructions:       Counseled: Patient, Regarding diagnosis, Regarding medications, Verbalized understanding.

## 2022-02-15 NOTE — LETTER
(Inserted Image. Unable to display)   September 24, 2019      QUINTIN VELAZQUEZ      [address]   [place], [place] [id number]        Dear QUINTIN,    Thank you for selecting [facility] for your healthcare needs.  Below you will find the results of the recent tests done at our clinic.     We will discuss this at your next visit.      Result Name Current Result Previous Result Reference Range   Glucose Level (mg/dL)  91 9/21/2019  84 9/4/2018 65 - 99   Cholesterol (mg/dL)  196 9/21/2019 ((H)) 221 9/4/2018  - <200   HDL (mg/dL)  60 9/21/2019 ((L)) 45 9/4/2018 >50 -    LDL ((H)) 120 9/21/2019 ((H)) 143 9/4/2018    Triglyceride (mg/dL)  66 9/21/2019 ((H)) 193 9/4/2018  - <150       Please contact me or my assistant at [phone] if you have any questions.     Sincerely,        Jacob Gonzalez MD        What do your labs mean?  Below is a glossary of commonly ordered labs:  LDL   Bad Cholesterol   HDL   Good Cholesterol  AST/ALT   Liver Function   Cr/Creatinine   Kidney Function  Microalbumin   Kidney Function  BUN   Kidney Function  PSA   Prostate    TSH   Thyroid Hormone  HgbA1c   Diabetes Test   Hgb (Hemoglobin)   Red Blood Cells

## 2022-02-15 NOTE — LETTER
(Inserted Image. Unable to display)            December 08, 2021        QUINTIN VELAZQUEZ      [address]   [place] [address]        Dear QUINTIN,    Thank you for selecting [facility]  for your healthcare needs.  Below you will find the results of the recent tests done at our clinic.     Work on low cholesterol diet and recheck in 1 year.      Result Name Current Result Previous Result Reference Range   Cholesterol (mg/dL) ((H)) 246 12/7/2021   - <200   HDL (mg/dL)  59 12/7/2021  > OR = 50 -    Triglyceride (mg/dL)  110 12/7/2021   - <150   LDL ((H)) 164 12/7/2021     Glucose Level (mg/dL)  82 12/7/2021  94 2/4/2020 65 - 99       Please contact me or my assistant at [phone] if you have any questions.     Sincerely,        Jacob Gonzalez MD        What do your labs mean?  Below is a glossary of commonly ordered labs:  LDL   Bad Cholesterol   HDL   Good Cholesterol  AST/ALT   Liver Function   Cr/Creatinine   Kidney Function  Microalbumin   Kidney Function  BUN   Kidney Function  PSA   Prostate    TSH   Thyroid Hormone  HgbA1c   Diabetes Test   Hgb (Hemoglobin)   Red Blood Cells

## 2022-02-15 NOTE — TELEPHONE ENCOUNTER
---------------------From: Amna Tineo To:  [email or web address]>;   Sent: 11/7/2019 5:52:27 AM CSTSubject: General Message Patient: QUINTIN VELAZQUEZ; YOB: 1965 The attached Referral Note is for an appointment to be scheduled. Order is faxed to [facility] and they will contact patient.

## 2022-02-15 NOTE — PROGRESS NOTES
Patient:   QUINTIN VELAZQUEZ            MRN: [id number]            FIN: [id number]               Age:   54 years     Sex:  Female     :  1965   Associated Diagnoses:   None   Author:   Lisa NUNES, Jacob      Procedure   EKG procedure   Indication: preop.     Position: supine.     EKG findings   Interpretation: by primary care provider.     Rhythm: sinus.     Axis: normal axis, normal configuration.     Within normal limits.     Intervals: UT normal, QRS normal, QT normal.     Normal EKG.     P waves: normal.     QRS complex: normal, no Q waves present.     ST-T-U complex: normal.     Interpretation: normal EKG, no ST-T wave abnormalities.     Discussed: with patient.        Impression and Plan   Orders

## 2022-03-02 NOTE — TELEPHONE ENCOUNTER
---------------------  From: Mariela Kebede LPN (Phone Messages Pool ([id number]_[facility]))   To: Advanced Practice Provider [place] ([id number]_[facility]);     Cc: Jacob Gonzalez MD;      Sent: 1/3/2022 3:58:37 PM CST      Phone Message    PCP: KAMLA    Time of call: 1510 message left    Person calling: Ashley  Contact # : [phone]    MESSAGE: Pt calls stating that she had a cold last week but is nearly back to normal this week. She is scheduled to receive her second dose of COVID tomorrow. Wondering if this is ok due to recent illness. Please advise.    Last visit/reason: 12/07/2021 - px?   ?   ---------------------  From: Jacob Gonzalez  To: Mariela Kebede LPN (Phone Messages Pool ([id number]_[facility]))  Sent: January 3, 2022 4:06 PM CST  Subject: RE: No Subject  ???  Ok for shotpatient advised.

## 2022-12-12 PROBLEM — L40.9 SCALP PSORIASIS: Status: ACTIVE | Noted: 2022-12-12

## 2022-12-12 PROBLEM — E66.9 OBESITY: Status: ACTIVE | Noted: 2022-12-12

## 2022-12-12 PROBLEM — E78.00 HYPERCHOLESTEROLEMIA: Status: ACTIVE | Noted: 2022-12-12

## 2023-02-14 ENCOUNTER — OFFICE VISIT (OUTPATIENT)
Dept: FAMILY MEDICINE | Facility: CLINIC | Age: 58
End: 2023-02-14
Payer: COMMERCIAL

## 2023-02-14 VITALS
BODY MASS INDEX: 32.44 KG/M2 | WEIGHT: 190 LBS | SYSTOLIC BLOOD PRESSURE: 108 MMHG | HEART RATE: 72 BPM | HEIGHT: 64 IN | DIASTOLIC BLOOD PRESSURE: 82 MMHG | TEMPERATURE: 98.7 F

## 2023-02-14 DIAGNOSIS — Z00.00 WELL ADULT EXAM: Primary | ICD-10-CM

## 2023-02-14 DIAGNOSIS — G47.33 OBSTRUCTIVE SLEEP APNEA SYNDROME: ICD-10-CM

## 2023-02-14 DIAGNOSIS — Z12.11 COLON CANCER SCREENING: ICD-10-CM

## 2023-02-14 DIAGNOSIS — E78.00 HYPERCHOLESTEROLEMIA: ICD-10-CM

## 2023-02-14 DIAGNOSIS — Z23 NEED FOR VACCINATION: ICD-10-CM

## 2023-02-14 LAB
CHOLEST SERPL-MCNC: 241 MG/DL
FASTING STATUS PATIENT QL REPORTED: NORMAL
GLUCOSE SERPL-MCNC: 96 MG/DL (ref 70–99)
HDLC SERPL-MCNC: 58 MG/DL
LDLC SERPL CALC-MCNC: 162 MG/DL
NONHDLC SERPL-MCNC: 183 MG/DL
TRIGL SERPL-MCNC: 106 MG/DL

## 2023-02-14 PROCEDURE — 36415 COLL VENOUS BLD VENIPUNCTURE: CPT | Performed by: FAMILY MEDICINE

## 2023-02-14 PROCEDURE — 80061 LIPID PANEL: CPT | Performed by: FAMILY MEDICINE

## 2023-02-14 PROCEDURE — 99396 PREV VISIT EST AGE 40-64: CPT | Mod: 25 | Performed by: FAMILY MEDICINE

## 2023-02-14 PROCEDURE — 90471 IMMUNIZATION ADMIN: CPT | Performed by: FAMILY MEDICINE

## 2023-02-14 PROCEDURE — 90682 RIV4 VACC RECOMBINANT DNA IM: CPT | Performed by: FAMILY MEDICINE

## 2023-02-14 PROCEDURE — 82947 ASSAY GLUCOSE BLOOD QUANT: CPT | Mod: QW | Performed by: FAMILY MEDICINE

## 2023-02-14 ASSESSMENT — ENCOUNTER SYMPTOMS
NERVOUS/ANXIOUS: 0
WEAKNESS: 0
HEARTBURN: 0
FREQUENCY: 0
MYALGIAS: 0
DYSURIA: 0
HEMATOCHEZIA: 0
ARTHRALGIAS: 0
ABDOMINAL PAIN: 0
CONSTIPATION: 0
EYE PAIN: 0
DIZZINESS: 0
SORE THROAT: 0
PARESTHESIAS: 0
NAUSEA: 0
PALPITATIONS: 0
JOINT SWELLING: 0
COUGH: 0
HEADACHES: 0
HEMATURIA: 0
DIARRHEA: 0
SHORTNESS OF BREATH: 0
FEVER: 0
CHILLS: 0
BREAST MASS: 0

## 2023-02-14 NOTE — LETTER
February 15, 2023      Ashley L Mario  [address] [facility] [address]        Dear ,    We are writing to inform you of your test results.    Your test results fall within the expected range(s) or remain unchanged from previous results.  Please continue with current treatment plan.    Resulted Orders   Glucose   Result Value Ref Range    Glucose 96 70 - 99 mg/dL    Patient Fasting > 8hrs? Unknown    Lipid panel reflex to direct LDL Fasting   Result Value Ref Range    Cholesterol 241 (H) <200 mg/dL    Triglycerides 106 <150 mg/dL    Direct Measure HDL 58 >=50 mg/dL    LDL Cholesterol Calculated 162 (H) <=100 mg/dL    Non HDL Cholesterol 183 (H) <130 mg/dL    Narrative    Cholesterol  Desirable:  <200 mg/dL    Triglycerides  Normal:  Less than 150 mg/dL  Borderline High:  150-199 mg/dL  High:  200-499 mg/dL  Very High:  Greater than or equal to 500 mg/dL    Direct Measure HDL  Female:  Greater than or equal to 50 mg/dL   Male:  Greater than or equal to 40 mg/dL    LDL Cholesterol  Desirable:  <100mg/dL  Above Desirable:  100-129 mg/dL   Borderline High:  130-159 mg/dL   High:  160-189 mg/dL   Very High:  >= 190 mg/dL    Non HDL Cholesterol  Desirable:  130 mg/dL  Above Desirable:  130-159 mg/dL  Borderline High:  160-189 mg/dL  High:  190-219 mg/dL  Very High:  Greater than or equal to 220 mg/dL       If you have any questions or concerns, please call the clinic at the number listed above.       Sincerely,      Jacob Gonzalez MD

## 2023-02-14 NOTE — PROGRESS NOTES
SUBJECTIVE:   CC: Ashley is an 58 year old who presents for preventive health visit.   Patient has been advised of split billing requirements and indicates understanding: Yes   Patient is retired.  She lost her 35-year-old son-in-law last year she is helping with care of her grandkids.  Her son had significant hand infection requiring amputation of the finger.  He has a new diagnosis of diabetes at that time.  [place]  Her mother has some dementia issues.  Healthy Habits:     Getting at least 3 servings of Calcium per day:  NO    Bi-annual eye exam:  Yes    Dental care twice a year:  Yes    Sleep apnea or symptoms of sleep apnea:  Sleep apnea    Diet:  Regular (no restrictions)    Frequency of exercise:  2-3 days/week    Duration of exercise:  15-30 minutes    Taking medications regularly:  Not Applicable    Medication side effects:  Not applicable    PHQ-2 Total Score: 0    Additional concerns today:  No      Today's PHQ-2 Score:   PHQ-2 ( 1999 [employer]) 2/14/2023   Q1: Little interest or pleasure in doing things 0   Q2: Feeling down, depressed or hopeless 0   PHQ-2 Score 0   Q1: Little interest or pleasure in doing things Not at all   Q2: Feeling down, depressed or hopeless Not at all   PHQ-2 Score 0       Have you ever done Advance Care Planning? (For example, a Health Directive, POLST, or a discussion with a medical provider or your loved ones about your wishes): No, advance care planning information given to patient to review.  Patient plans to discuss their wishes with loved ones or provider.      Social History     Tobacco Use     Smoking status: Never     Smokeless tobacco: Never   Substance Use Topics     Alcohol use: Not on file     If you drink alcohol do you typically have >3 drinks per day or >7 drinks per week? No    Alcohol Use 2/14/2023   Prescreen: >3 drinks/day or >7 drinks/week? No       Reviewed orders with patient.  Reviewed  "health maintenance and updated orders accordingly - Yes      Breast Cancer Screening:    Breast CA Risk Assessment (FHS-7) 2/14/2023   Do you have a family history of breast, colon, or ovarian cancer? No / Unknown         Mammogram Screening: Recommended mammography every 1-2 years with patient discussion and risk factor consideration  Pertinent mammograms are reviewed under the imaging tab.    History of abnormal Pap smear: Status post benign hysterectomy. Health Maintenance and Surgical History updated.     Reviewed and updated as needed this visit by clinical staff   Tobacco  Allergies  Meds              Reviewed and updated as needed this visit by Provider                     Review of Systems   Constitutional: Negative for chills and fever.   HENT: Negative for congestion, ear pain, hearing loss and sore throat.    Eyes: Negative for pain and visual disturbance.   Respiratory: Negative for cough and shortness of breath.    Cardiovascular: Negative for chest pain, palpitations and peripheral edema.   Gastrointestinal: Negative for abdominal pain, constipation, diarrhea, heartburn, hematochezia and nausea.   Breasts:  Negative for tenderness, breast mass and discharge.   Genitourinary: Negative for dysuria, frequency, genital sores, hematuria, pelvic pain, urgency, vaginal bleeding and vaginal discharge.   Musculoskeletal: Negative for arthralgias, joint swelling and myalgias.   Skin: Negative for rash.   Neurological: Negative for dizziness, weakness, headaches and paresthesias.   Psychiatric/Behavioral: Negative for mood changes. The patient is not nervous/anxious.           OBJECTIVE:   /82 (BP Location: Right arm, Patient Position: Sitting, Cuff Size: Adult Large)   Pulse 72   Temp 98.7  F (37.1  C) (Temporal)   Ht 1.626 m (5' 4\")   Wt 86.2 kg (190 lb)   BMI 32.61 kg/m    Physical Exam  GENERAL APPEARANCE: healthy, alert and no distress  EYES: Eyes grossly normal to inspection, PERRL and " conjunctivae and sclerae normal  HENT: ear canals and TM's normal, nose and mouth without ulcers or lesions, oropharynx clear and oral mucous membranes moist  NECK: no adenopathy, no asymmetry, masses, or scars and thyroid normal to palpation  RESP: lungs clear to auscultation - no rales, rhonchi or wheezes  CV: regular rate and rhythm, normal S1 S2, no S3 or S4, no murmur, click or rub, no peripheral edema and peripheral pulses strong  ABDOMEN: soft, nontender, no hepatosplenomegaly, no masses and bowel sounds normal  MS: no musculoskeletal defects are noted and gait is age appropriate without ataxia  SKIN: no suspicious lesions or rashes  NEURO: Normal strength and tone, sensory exam grossly normal, mentation intact and speech normal  PSYCH: mentation appears normal and affect normal/bright        ASSESSMENT/PLAN:       (Z00.00) Well adult exam  (primary encounter diagnosis)  Comment: Healthcare maintenance advance directives reviewed  Plan:     (Z12.11) Colon cancer screening  Comment: Due for Cologuard  Plan: COLOGUARD([employer])            (E78.00) Hypercholesterolemia  Comment: Cardiac calcium score 0  Plan:     (G47.33) Obstructive sleep apnea syndrome  Comment: Uses CPAP  Plan:     (Z23) Need for vaccination  Comment:   Plan: INFLUENZA VACCINE 50-64 OR 18-64 W/EGG ALLERGY         (FLUBLOK)                  COUNSELING:  Reviewed preventive health counseling, as reflected in patient instructions        She reports that she has never smoked. She has never used smokeless tobacco.      Jacob Gonzalez MD  [facility]

## 2024-02-27 PROBLEM — Z90.710 HISTORY OF HYSTERECTOMY: Status: ACTIVE | Noted: 2024-02-27

## 2024-02-29 ENCOUNTER — OFFICE VISIT (OUTPATIENT)
Dept: FAMILY MEDICINE | Facility: CLINIC | Age: 59
End: 2024-02-29
Payer: COMMERCIAL

## 2024-02-29 VITALS
TEMPERATURE: 98.4 F | RESPIRATION RATE: 16 BRPM | HEIGHT: 64 IN | WEIGHT: 196.5 LBS | DIASTOLIC BLOOD PRESSURE: 73 MMHG | SYSTOLIC BLOOD PRESSURE: 112 MMHG | OXYGEN SATURATION: 98 % | HEART RATE: 61 BPM | BODY MASS INDEX: 33.55 KG/M2

## 2024-02-29 DIAGNOSIS — E78.00 HYPERCHOLESTEROLEMIA: ICD-10-CM

## 2024-02-29 DIAGNOSIS — G47.33 OSA (OBSTRUCTIVE SLEEP APNEA): ICD-10-CM

## 2024-02-29 DIAGNOSIS — G47.33 OBSTRUCTIVE SLEEP APNEA SYNDROME: ICD-10-CM

## 2024-02-29 DIAGNOSIS — M85.80 OSTEOPENIA, UNSPECIFIED LOCATION: ICD-10-CM

## 2024-02-29 DIAGNOSIS — R00.2 PALPITATIONS: ICD-10-CM

## 2024-02-29 DIAGNOSIS — Z13.1 SCREENING FOR DIABETES MELLITUS: ICD-10-CM

## 2024-02-29 DIAGNOSIS — Z00.00 WELL ADULT EXAM: Primary | ICD-10-CM

## 2024-02-29 DIAGNOSIS — Z90.710 HISTORY OF HYSTERECTOMY: ICD-10-CM

## 2024-02-29 DIAGNOSIS — Z23 NEED FOR VACCINATION: ICD-10-CM

## 2024-02-29 LAB
CHOLEST SERPL-MCNC: 239 MG/DL
FASTING STATUS PATIENT QL REPORTED: YES
FASTING STATUS PATIENT QL REPORTED: YES
GLUCOSE SERPL-MCNC: 96 MG/DL (ref 70–99)
HDLC SERPL-MCNC: 48 MG/DL
LDLC SERPL CALC-MCNC: 161 MG/DL
NONHDLC SERPL-MCNC: 191 MG/DL
TRIGL SERPL-MCNC: 149 MG/DL

## 2024-02-29 PROCEDURE — 82947 ASSAY GLUCOSE BLOOD QUANT: CPT | Mod: QW | Performed by: FAMILY MEDICINE

## 2024-02-29 PROCEDURE — 90471 IMMUNIZATION ADMIN: CPT | Performed by: FAMILY MEDICINE

## 2024-02-29 PROCEDURE — 99213 OFFICE O/P EST LOW 20 MIN: CPT | Mod: 25 | Performed by: FAMILY MEDICINE

## 2024-02-29 PROCEDURE — 90715 TDAP VACCINE 7 YRS/> IM: CPT | Performed by: FAMILY MEDICINE

## 2024-02-29 PROCEDURE — 93000 ELECTROCARDIOGRAM COMPLETE: CPT | Performed by: FAMILY MEDICINE

## 2024-02-29 PROCEDURE — 36415 COLL VENOUS BLD VENIPUNCTURE: CPT | Performed by: FAMILY MEDICINE

## 2024-02-29 PROCEDURE — 80061 LIPID PANEL: CPT | Performed by: FAMILY MEDICINE

## 2024-02-29 PROCEDURE — 99396 PREV VISIT EST AGE 40-64: CPT | Mod: 25 | Performed by: FAMILY MEDICINE

## 2024-02-29 NOTE — LETTER
March 1, 2024      Ashley L Mario  [address] [facility] [address]        Dear ,    We are writing to inform you of your test results.    Your test results fall within the expected range(s) or remain unchanged from previous results.  Please continue with current treatment plan.    Resulted Orders   Lipid panel reflex to direct LDL Fasting   Result Value Ref Range    Cholesterol 239 (H) <200 mg/dL    Triglycerides 149 <150 mg/dL    Direct Measure HDL 48 (L) >=50 mg/dL    LDL Cholesterol Calculated 161 (H) <=100 mg/dL    Non HDL Cholesterol 191 (H) <130 mg/dL    Patient Fasting > 8hrs? Yes     Narrative    Cholesterol  Desirable:  <200 mg/dL    Triglycerides  Normal:  Less than 150 mg/dL  Borderline High:  150-199 mg/dL  High:  200-499 mg/dL  Very High:  Greater than or equal to 500 mg/dL    Direct Measure HDL  Female:  Greater than or equal to 50 mg/dL   Male:  Greater than or equal to 40 mg/dL    LDL Cholesterol  Desirable:  <100mg/dL  Above Desirable:  100-129 mg/dL   Borderline High:  130-159 mg/dL   High:  160-189 mg/dL   Very High:  >= 190 mg/dL    Non HDL Cholesterol  Desirable:  130 mg/dL  Above Desirable:  130-159 mg/dL  Borderline High:  160-189 mg/dL  High:  190-219 mg/dL  Very High:  Greater than or equal to 220 mg/dL   Glucose   Result Value Ref Range    Glucose 96 70 - 99 mg/dL    Patient Fasting > 8hrs? Yes        If you have any questions or concerns, please call the clinic at the number listed above.       Sincerely,      Jacob Gonzalez MD

## 2024-02-29 NOTE — PROGRESS NOTES
"Preventive Care Visit  [facility]  Jacob Gonzalez MD, Family Medicine  Feb 29, 2024    Assessment & Plan     Well adult exam  Healthcare maintenance and advance directives reviewed    Hypercholesterolemia  Negative cardiac calcium score  - Lipid panel reflex to direct LDL Fasting; Future  - Lipid panel reflex to direct LDL Fasting    Obstructive sleep apnea syndrome  CPAP    History of hysterectomy      Osteopenia, unspecified location  Due for DEXA scan at age 61    Screening for diabetes mellitus    - Glucose; Future  - Glucose    CRUZITO (obstructive sleep apnea)      Need for vaccination    - TDAP 10-64Y (ADACEL,BOOSTRIX)    Palpitations  EKG is normal if they start increasing frequency consider Holter  - EKG 12-lead complete w/read - Clinics              BMI  Estimated body mass index is 33.73 kg/m  as calculated from the following:    Height as of this encounter: 1.626 m (5' 4\").    Weight as of this encounter: 89.1 kg (196 lb 8 oz).       Counseling  Appropriate preventive services were discussed with this patient, including applicable screening as appropriate for fall prevention, nutrition, physical activity, Tobacco-use cessation, weight loss and cognition.  Checklist reviewing preventive services available has been given to the patient.  Reviewed patient's diet, addressing concerns and/or questions.   She is at risk for lack of exercise and has been provided with information to increase physical activity for the benefit of her well-being.           Sherif Ramirez is a 59 year old, presenting for the following: She is retired now living independently with her .  Enjoying her grandkids.  Tremendous stress the last year her mom has Alzheimer and is at home with her dad.  She has been very engaged in working and helping them.  Noticed some more PVCs at times specially if she has any alcohol.  Physical        2/29/2024     8:54 AM   Additional Questions   Roomed by Yoli"        Health Care Directive  Patient does not have a Health Care Directive or Living Will:     HPI        Patient Active Problem List   Diagnosis    Hypercholesterolemia    Obesity    CRUZITO (obstructive sleep apnea)    Scalp psoriasis    History of hysterectomy    Osteopenia, unspecified location     Current Outpatient Medications   Medication    ONE DAILY MULTIPLE VITAMIN OR     No current facility-administered medications for this visit.           2/29/2024   General Health   How would you rate your overall physical health? Good   Feel stress (tense, anxious, or unable to sleep) Rather much   (!) STRESS CONCERN      2/29/2024   Nutrition   Three or more servings of calcium each day? Yes   Diet: Regular (no restrictions)   How many servings of fruit and vegetables per day? (!) 2-3   How many sweetened beverages each day? 0-1         2/29/2024   Exercise   Days per week of moderate/strenous exercise 3 days   Average minutes spent exercising at this level 30 min         2/29/2024   Social Factors   Frequency of gathering with friends or relatives Once a week   Worry food won't last until get money to buy more No   Food not last or not have enough money for food? No   Do you have housing?  Yes   Are you worried about losing your housing? No   Lack of transportation? No   Unable to get utilities (heat,electricity)? No         2/29/2024   Fall Risk   Fallen 2 or more times in the past year? No   Trouble with walking or balance? No          2/29/2024   Dental   Dentist two times every year? Yes         2/29/2024   TB Screening   Were you born outside of [place]?  No         Today's PHQ-2 Score:       2/29/2024     8:53 AM   PHQ-2 ( 1999 [employer])   Q1: Little interest or pleasure in doing things 0   Q2: Feeling down, depressed or hopeless 1   PHQ-2 Score 1   Q1: Little interest or pleasure in doing things Not at all   Q2: Feeling down, depressed or hopeless Several days   PHQ-2 Score 1           2/29/2024   Substance Use  "  Alcohol more than 3/day or more than 7/wk Not Applicable   Do you use any other substances recreationally? No     Social History     Tobacco Use    Smoking status: Never     Passive exposure: Never    Smokeless tobacco: Never   Vaping Use    Vaping Use: Never used             2/29/2024   Breast Cancer Screening   Family history of breast, colon, or ovarian cancer? No / Unknown              2/29/2024   STI Screening   New sexual partner(s) since last STI/HIV test? No     History of abnormal Pap smear: Status post benign hysterectomy. Health Maintenance and Surgical History updated.       ASCVD Risk   The 10-year ASCVD risk score (Terrie MONTANA, et al., 2019) is: 2.3%    Values used to calculate the score:      Age: 59 years      Sex: Female      Is Non- : No      Diabetic: No      Tobacco smoker: No      Systolic Blood Pressure: 112 mmHg      Is BP treated: No      HDL Cholesterol: 58 mg/dL      Total Cholesterol: 241 mg/dL           Reviewed and updated as needed this visit by Provider                             Objective    Exam  /73 (BP Location: Right arm, Patient Position: Sitting)   Pulse 61   Temp 98.4  F (36.9  C) (Tympanic)   Resp 16   Ht 1.626 m (5' 4\")   Wt 89.1 kg (196 lb 8 oz)   LMP  (LMP Unknown)   SpO2 98%   BMI 33.73 kg/m     Estimated body mass index is 33.73 kg/m  as calculated from the following:    Height as of this encounter: 1.626 m (5' 4\").    Weight as of this encounter: 89.1 kg (196 lb 8 oz).    Physical Exam  GENERAL: alert and no distress  EYES: Eyes grossly normal to inspection, PERRL and conjunctivae and sclerae normal  HENT: ear canals and TM's normal, nose and mouth without ulcers or lesions  NECK: no adenopathy, no asymmetry, masses, or scars  RESP: lungs clear to auscultation - no rales, rhonchi or wheezes  CV: regular rate and rhythm, normal S1 S2, no S3 or S4, no murmur, click or rub, no peripheral edema  ABDOMEN: soft, nontender, no " hepatosplenomegaly, no masses and bowel sounds normal  MS: no gross musculoskeletal defects noted, no edema  SKIN: no suspicious lesions or rashes  NEURO: Normal strength and tone, mentation intact and speech normal  PSYCH: mentation appears normal, affect normal/bright      Signed Electronically by: Jacob Gonzalez MD

## 2024-12-11 ENCOUNTER — TELEPHONE (OUTPATIENT)
Dept: FAMILY MEDICINE | Facility: CLINIC | Age: 59
End: 2024-12-11
Payer: COMMERCIAL

## 2024-12-11 NOTE — TELEPHONE ENCOUNTER
Order/Referral Request    Who is requesting: patient     Orders being requested: Colonoscopy     Reason service is needed/diagnosis: Positive Cologard test     When are orders needed by: Soon     Has this been discussed with Provider: No    Does patient have a preference on a Group/Provider/Facility? Lives in [place]     Does patient have an appointment scheduled?: No    Where to send orders: Place orders within Epic    Could we send this information to you in [facility] or would you prefer to receive a phone call?:   Patient would prefer a phone call   Okay to leave a detailed message?: Yes at Cell number on file:    Telephone Information:   Mobile [phone]

## 2025-01-29 ENCOUNTER — PATIENT OUTREACH (OUTPATIENT)
Dept: CARE COORDINATION | Facility: CLINIC | Age: 60
End: 2025-01-29
Payer: COMMERCIAL

## 2025-04-06 ENCOUNTER — HEALTH MAINTENANCE LETTER (OUTPATIENT)
Age: 60
End: 2025-04-06